# Patient Record
Sex: MALE | Race: OTHER | Employment: FULL TIME | URBAN - METROPOLITAN AREA
[De-identification: names, ages, dates, MRNs, and addresses within clinical notes are randomized per-mention and may not be internally consistent; named-entity substitution may affect disease eponyms.]

---

## 2018-07-16 ENCOUNTER — APPOINTMENT (OUTPATIENT)
Dept: GENERAL RADIOLOGY | Age: 38
End: 2018-07-16
Attending: EMERGENCY MEDICINE
Payer: OTHER MISCELLANEOUS

## 2018-07-16 ENCOUNTER — HOSPITAL ENCOUNTER (OUTPATIENT)
Age: 38
Discharge: HOME | End: 2018-07-16
Attending: EMERGENCY MEDICINE
Payer: OTHER MISCELLANEOUS

## 2018-07-16 VITALS
HEIGHT: 64 IN | RESPIRATION RATE: 16 BRPM | DIASTOLIC BLOOD PRESSURE: 57 MMHG | SYSTOLIC BLOOD PRESSURE: 131 MMHG | BODY MASS INDEX: 30.73 KG/M2 | WEIGHT: 180 LBS | TEMPERATURE: 98 F | OXYGEN SATURATION: 100 % | HEART RATE: 70 BPM

## 2018-07-16 DIAGNOSIS — S40.011A CONTUSION OF RIGHT SHOULDER, INITIAL ENCOUNTER: ICD-10-CM

## 2018-07-16 DIAGNOSIS — S00.33XA CONTUSION OF NOSE, INITIAL ENCOUNTER: Primary | ICD-10-CM

## 2018-07-16 PROCEDURE — 70160 X-RAY EXAM OF NASAL BONES: CPT | Performed by: EMERGENCY MEDICINE

## 2018-07-16 PROCEDURE — 99204 OFFICE O/P NEW MOD 45 MIN: CPT

## 2018-07-16 PROCEDURE — 73030 X-RAY EXAM OF SHOULDER: CPT | Performed by: EMERGENCY MEDICINE

## 2018-07-16 RX ORDER — IBUPROFEN 600 MG/1
600 TABLET ORAL EVERY 8 HOURS PRN
Qty: 30 TABLET | Refills: 0 | Status: SHIPPED | OUTPATIENT
Start: 2018-07-16 | End: 2018-07-23

## 2018-07-16 NOTE — ED PROVIDER NOTES
Patient Seen in: [address]    History   Patient presents with:   Worker's Comp  Fall (musculoskeletal, neurologic)  Upper Extremity Injury (musculoskeletal)    Stated Complaint: workers comp    HPI    Patient is a 37-yea Vitals [07/16/18 3667]  BP: 131/57  Pulse: 70  Resp: 16  Temp: 98.2 °F (36.8 °C)  Temp src: Oral  SpO2: 100 %  O2 Device: None (Room air)    Current:/57   Pulse 70   Temp 98.2 °F (36.8 °C) (Oral)   Resp 16   Ht 162.6 cm (5' 4\")   Wt 81.6 kg   SpO2 1 of nose, initial encounter  (primary encounter diagnosis)  Contusion of right shoulder, initial encounter    Disposition:  Discharge  7/16/2018  5:41 pm    Follow-up:  No follow-up provider specified.       Medications Prescribed:  Current Discharge Medicat

## 2018-07-16 NOTE — ED NOTES
Gave patient work activity form and educated on importance to call  and follow up with [facility] for work clearance. Patient and coworker verbalize understanding. This RN highlighted phone number and geeta address.

## 2018-07-16 NOTE — ED INITIAL ASSESSMENT (HPI)
Patient fell off ladder approx 8ft high on wooden deck. Patient complains of right should pain and states he landed on shoulder and his nose hit the ground. Nose not actively bleeding at the moment. Denies LOC. Patient has limited ROM of right shoulder.  No

## 2018-07-16 NOTE — ED NOTES
Patient back in room awaiting for results of Xrays taken. Provided comfort measures. No furthers questions or concerns by patient.

## 2018-07-18 ENCOUNTER — HOSPITAL ENCOUNTER (OUTPATIENT)
Dept: GENERAL RADIOLOGY | Age: 38
Discharge: HOME | End: 2018-07-18
Attending: FAMILY MEDICINE

## 2018-07-18 ENCOUNTER — OFFICE VISIT (OUTPATIENT)
Dept: OCCUPATIONAL MEDICINE | Age: 38
End: 2018-07-18
Attending: FAMILY MEDICINE

## 2018-07-18 DIAGNOSIS — S80.11XA CONTUSION OF RIGHT LOWER LEG, INITIAL ENCOUNTER: ICD-10-CM

## 2018-07-18 DIAGNOSIS — S80.01XA CONTUSION OF RIGHT KNEE, INITIAL ENCOUNTER: Primary | ICD-10-CM

## 2018-07-18 DIAGNOSIS — S80.01XA CONTUSION OF RIGHT KNEE, INITIAL ENCOUNTER: ICD-10-CM

## 2018-07-18 PROCEDURE — 73590 X-RAY EXAM OF LOWER LEG: CPT | Performed by: FAMILY MEDICINE

## 2018-07-18 PROCEDURE — 73564 X-RAY EXAM KNEE 4 OR MORE: CPT | Performed by: FAMILY MEDICINE

## 2018-07-19 ENCOUNTER — OFFICE VISIT (OUTPATIENT)
Dept: PHYSICAL THERAPY | Age: 38
End: 2018-07-19
Attending: FAMILY MEDICINE
Payer: OTHER MISCELLANEOUS

## 2018-07-19 DIAGNOSIS — S40.019A SHOULDER CONTUSION: ICD-10-CM

## 2018-07-19 NOTE — PROGRESS NOTES
OCCUPATIONAL HEALTH PHYSICAL THERAPY EVALUATION:   Referring Physician: Dr. Yann Velasco  Diagnosis: R shoulder Contusion     Date of Service: 7/19/2018   Date of onset/injury: 7/16/2018 No. Of Authorized visits: [address] [facility] strength, and difficulty with reaching, carrying, and lifting activities. Rebekah Das would benefit from skilled Physical Therapy to address the above impairments to relieve pain and return R UE to normal mobility.     Precautions:  None    OBJECTIVE:     Obs co-sign or sign and return this letter via fax as soon as possible to [phone].  If you have any questions, please contact me at Dept: [phone]    Sincerely,  Electronically signed by therapist: Eben Martinez PT    Physician's certification requir

## 2018-07-20 ENCOUNTER — OFFICE VISIT (OUTPATIENT)
Dept: PHYSICAL THERAPY | Age: 38
End: 2018-07-20
Attending: FAMILY MEDICINE
Payer: OTHER MISCELLANEOUS

## 2018-07-20 PROCEDURE — 97110 THERAPEUTIC EXERCISES: CPT | Performed by: PHYSICAL THERAPIST

## 2018-07-20 NOTE — PROGRESS NOTES
Diagnosis: R shoulder Contusion        Next MD visit: none scheduled  Fall Risk: standard         Precautions: n/a          Medication Changes since last visit?: No    Subjective: Complains of R shoulder stiffness.  States he is doing well with his exercise

## 2018-07-23 ENCOUNTER — OFFICE VISIT (OUTPATIENT)
Dept: PHYSICAL THERAPY | Age: 38
End: 2018-07-23
Attending: FAMILY MEDICINE
Payer: OTHER MISCELLANEOUS

## 2018-07-23 NOTE — PROGRESS NOTES
Diagnosis: R shoulder Contusion        Next MD visit: 7/24/18  Fall Risk: standard         Precautions: n/a          Medication Changes since last visit?: No    Subjective: Patient c/o 0/10 R shoulder pain at rest and 3/10 R shoulder pain with OH movements Time: 45 min

## 2018-07-24 ENCOUNTER — APPOINTMENT (OUTPATIENT)
Dept: OCCUPATIONAL MEDICINE | Age: 38
End: 2018-07-24
Attending: FAMILY MEDICINE

## 2018-07-25 ENCOUNTER — OFFICE VISIT (OUTPATIENT)
Dept: PHYSICAL THERAPY | Age: 38
End: 2018-07-25
Attending: FAMILY MEDICINE
Payer: OTHER MISCELLANEOUS

## 2018-07-25 NOTE — PROGRESS NOTES
Diagnosis: R shoulder Contusion        Next MD visit: 7/24/18  Fall Risk: standard         Precautions: n/a          Medication Changes since last visit?: No    Subjective: Patient c/o increased shoulder discomfort today.  States he woke up with increased R

## 2018-07-31 ENCOUNTER — OFFICE VISIT (OUTPATIENT)
Dept: PHYSICAL THERAPY | Age: 38
End: 2018-07-31
Attending: FAMILY MEDICINE
Payer: OTHER MISCELLANEOUS

## 2018-07-31 NOTE — PROGRESS NOTES
Diagnosis: R shoulder Contusion        Next MD visit: 8/7/18  Fall Risk: standard         Precautions: n/a          Medication Changes since last visit?: No    Subjective: Patient c/o 0/10 R shoulder pain today at rest and 3/10 pain with OH motions.  Julia

## 2018-08-02 ENCOUNTER — APPOINTMENT (OUTPATIENT)
Dept: OCCUPATIONAL MEDICINE | Age: 38
End: 2018-08-02
Attending: ORTHOPAEDIC SURGERY
Payer: OTHER MISCELLANEOUS

## 2018-08-02 ENCOUNTER — OFFICE VISIT (OUTPATIENT)
Dept: PHYSICAL THERAPY | Age: 38
End: 2018-08-02
Attending: FAMILY MEDICINE
Payer: OTHER MISCELLANEOUS

## 2018-08-02 NOTE — PROGRESS NOTES
Diagnosis: R shoulder Contusion        Next MD visit: 8/7/18  Fall Risk: standard         Precautions: n/a          Medication Changes since last visit?: No    Subjective: Patient c/o 0/10 R shoulder pain today. Patient reports he feels 95% better.        O